# Patient Record
Sex: FEMALE | Race: WHITE
[De-identification: names, ages, dates, MRNs, and addresses within clinical notes are randomized per-mention and may not be internally consistent; named-entity substitution may affect disease eponyms.]

---

## 2018-04-22 ENCOUNTER — HOSPITAL ENCOUNTER (EMERGENCY)
Dept: HOSPITAL 25 - UCCORT | Age: 68
Discharge: HOME | End: 2018-04-22
Payer: COMMERCIAL

## 2018-04-22 VITALS — SYSTOLIC BLOOD PRESSURE: 138 MMHG | DIASTOLIC BLOOD PRESSURE: 73 MMHG

## 2018-04-22 DIAGNOSIS — N39.0: ICD-10-CM

## 2018-04-22 DIAGNOSIS — Z87.891: ICD-10-CM

## 2018-04-22 DIAGNOSIS — K52.9: Primary | ICD-10-CM

## 2018-04-22 PROCEDURE — 96374 THER/PROPH/DIAG INJ IV PUSH: CPT

## 2018-04-22 PROCEDURE — G0463 HOSPITAL OUTPT CLINIC VISIT: HCPCS

## 2018-04-22 PROCEDURE — 96360 HYDRATION IV INFUSION INIT: CPT

## 2018-04-22 PROCEDURE — 99212 OFFICE O/P EST SF 10 MIN: CPT

## 2018-04-22 PROCEDURE — 87086 URINE CULTURE/COLONY COUNT: CPT

## 2018-04-22 PROCEDURE — 87186 SC STD MICRODIL/AGAR DIL: CPT

## 2018-04-22 PROCEDURE — 81003 URINALYSIS AUTO W/O SCOPE: CPT

## 2018-04-22 PROCEDURE — 87077 CULTURE AEROBIC IDENTIFY: CPT

## 2018-04-22 NOTE — UC
UC General HPI





- HPI Summary


HPI Summary: 





Shikha has had nausea and diarrhea for the past 48 hours, her grandkids that 

she is living with also had this but they seem to be getting better. she 

complains of diffuse cramping that comes and goes, denies any fever or nausea, 

no GI history. states that the diarrhea is liquid, food is running right though 

her. has not been drinking well it makes her nauseated





- History of Current Complaint


Chief Complaint: UCGI


Stated Complaint: STOMACH ACHE/DIARRHEA


Time Seen by Provider: 04/22/18 12:55


Hx Obtained From: Patient


Onset/Duration: Sudden Onset, Lasting Days


Timing: Constant


Onset Severity: Moderate


Current Severity: Moderate


Pain Intensity: 5


Associated Signs & Symptoms: Positive: Diarrhea, Nausea





- Allergy/Home Medications


Allergies/Adverse Reactions: 


 Allergies











Allergy/AdvReac Type Severity Reaction Status Date / Time


 


No Known Allergies Allergy   Verified 04/22/18 13:01











Home Medications: 


 Home Medications





Aspirin 81 mg CHEW TAB* [Aspirin Low Dose TAB*] 81 mg PO DAILY 04/22/18 [

History Confirmed 04/22/18]


Loratadine 10 mg PO DAILY 04/22/18 [History Confirmed 04/22/18]











PMH/Surg Hx/FS Hx/Imm Hx


Previously Healthy: Yes





- Surgical History


Surgical History: Yes


Surgery Procedure, Year, and Place: c-sect in 1981.  skin grafting in 1970





- Social History


Alcohol Use: Occasionally


Substance Use Type: None


Smoking Status (MU): Former Smoker





Review of Systems


Constitutional: Negative


Skin: Negative


Eyes: Negative


ENT: Negative


Respiratory: Negative


Cardiovascular: Negative


Gastrointestinal: Vomiting, Diarrhea, Nausea


Genitourinary: Negative


Motor: Negative


Neurovascular: Negative


Musculoskeletal: Negative


Neurological: Negative


Psychological: Negative


Is Patient Immunocompromised?: No


All Other Systems Reviewed And Are Negative: Yes





Physical Exam


Triage Information Reviewed: Yes


Appearance: Well-Nourished, Ill-Appearing, Pain Distress


Vital Signs: 


 Initial Vital Signs











Temp  97.9 F   04/22/18 12:55


 


Pulse  82   04/22/18 12:55


 


Resp  20   04/22/18 12:55


 


BP  125/51   04/22/18 12:55


 


Pulse Ox  96   04/22/18 12:55











Vital Signs Reviewed: Yes


Eye Exam: Normal


ENT Exam: Normal


Dental Exam: Normal


Neck exam: Normal


Respiratory Exam: Normal


Respiratory: Positive: Chest non-tender, Lungs clear, Normal breath sounds


Cardiovascular Exam: Normal


Cardiovascular: Positive: RRR, No Murmur, Pulses Normal


Abdomen Description: Positive: Nontender - mild lower abdominal tenderness with 

palpation, Soft, CVA Tenderness (R) - neg, CVA Tenderness (L) - neg, Distended 

- feels bloated


Bowel Sounds: Positive: Hyperactive


Musculoskeletal Exam: Normal


Neurological Exam: Normal


Psychological Exam: Normal


Skin Exam: Normal





Re-Evaluation





- Re-Evaluation


  ** First Eval


Re-Evaluation Time: 14:17


Change: Improved - less dizzy





Course/Dx





- Course


Course Of Treatment: hx obtained, exam performed ,meds reviewed IV hydration 

and zofran iven, UA obtained,





- Differential Dx - Multi-Symptom


Provider Diagnoses: gastroenteritis.  UTI





Discharge





- Sign-Out/Discharge


Documenting (check all that apply): Discharge





- Discharge Plan


Condition: Stable


Disposition: HOME


Prescriptions: 


Cephalexin CAP* [Keflex CAP*] 500 mg PO BID #14 cap


Lactobacillus Acidophilus* 1 cap PO BID #60 cap


Ondansetron ODT TAB* [Zofran 4 MG Odt TAB*] 4 mg PO Q8H PRN #12 tab.odt


 PRN Reason: Nausea


Patient Education Materials:  Gastroenteritis (ED)


Referrals: 


Jeanne Villatoro MD [Primary Care Provider] - 


Additional Instructions: 


1. Start taking the Lactobacillus twice a day


2. Angelina diet, bread, rice bananas, apple sauce starting tomorrow. today clear 

fluids


3. REst


4Satert the antibiotic if you start developing urinary symptoms, a culture was 

sent due to Bacteria in your urine.





- Billing Disposition and Condition


Condition: STABLE


Disposition: HOME